# Patient Record
Sex: FEMALE | URBAN - METROPOLITAN AREA
[De-identification: names, ages, dates, MRNs, and addresses within clinical notes are randomized per-mention and may not be internally consistent; named-entity substitution may affect disease eponyms.]

---

## 2021-02-04 ENCOUNTER — APPOINTMENT (RX ONLY)
Dept: URBAN - METROPOLITAN AREA CLINIC 23 | Facility: CLINIC | Age: 66
Setting detail: DERMATOLOGY
End: 2021-02-04

## 2021-02-04 DIAGNOSIS — Z41.9 ENCOUNTER FOR PROCEDURE FOR PURPOSES OTHER THAN REMEDYING HEALTH STATE, UNSPECIFIED: ICD-10-CM

## 2021-02-04 PROCEDURE — ? DYSPORT

## 2021-02-04 NOTE — PROCEDURE: DYSPORT
Show Depressor Anguli Units: Yes
Lateral Platysmal Bands Units: 0
Additional Area 2 Location: lateral brows
Additional Area 5 Location: bunnies lines
Post-Care Instructions: Patient instructed to not lie down for 4 hours, limit physical activity for 24 hours and avoid manipulation of the treated areas.
Forehead Units: [address]
Expiration Date (Month Year): 09/30/21
Show Right And Left Periorbital Units: No
Dilution (U/ 0.1cc): 1.5
Additional Area 6 Location: chin
Detail Level: Detailed
Lot #: [id number]
Additional Area 1 Location: necks
Glabellar Complex Units: [address] [facility]
Periorbital Skin Units: 60
Consent: Written consent obtained. Risks include but not limited to lid/brow ptosis, bruising, swelling, diplopia, temporary effect, incomplete chemical denervation.
Additional Area 2 Units: 10